# Patient Record
Sex: MALE | Race: BLACK OR AFRICAN AMERICAN | NOT HISPANIC OR LATINO | URBAN - METROPOLITAN AREA
[De-identification: names, ages, dates, MRNs, and addresses within clinical notes are randomized per-mention and may not be internally consistent; named-entity substitution may affect disease eponyms.]

---

## 2018-11-22 ENCOUNTER — INPATIENT (INPATIENT)
Facility: HOSPITAL | Age: 33
LOS: 6 days | Discharge: HOME | DRG: 885 | End: 2018-11-29
Attending: PSYCHIATRY & NEUROLOGY | Admitting: PSYCHIATRY & NEUROLOGY
Payer: MEDICARE

## 2018-11-22 VITALS
TEMPERATURE: 99 F | SYSTOLIC BLOOD PRESSURE: 186 MMHG | RESPIRATION RATE: 18 BRPM | DIASTOLIC BLOOD PRESSURE: 87 MMHG | OXYGEN SATURATION: 100 % | HEART RATE: 106 BPM

## 2018-11-22 DIAGNOSIS — F29 UNSPECIFIED PSYCHOSIS NOT DUE TO A SUBSTANCE OR KNOWN PHYSIOLOGICAL CONDITION: ICD-10-CM

## 2018-11-22 LAB
ALBUMIN SERPL ELPH-MCNC: 5 G/DL — SIGNIFICANT CHANGE UP (ref 3.3–5)
ALP SERPL-CCNC: 45 U/L — SIGNIFICANT CHANGE UP (ref 40–120)
ALT FLD-CCNC: 71 U/L — HIGH (ref 10–45)
AMPHET UR-MCNC: NEGATIVE — SIGNIFICANT CHANGE UP
ANION GAP SERPL CALC-SCNC: 14 MMOL/L — SIGNIFICANT CHANGE UP (ref 5–17)
APPEARANCE UR: CLEAR — SIGNIFICANT CHANGE UP
AST SERPL-CCNC: 197 U/L — HIGH (ref 10–40)
BACTERIA # UR AUTO: PRESENT /HPF
BARBITURATES UR SCN-MCNC: NEGATIVE — SIGNIFICANT CHANGE UP
BENZODIAZ UR-MCNC: NEGATIVE — SIGNIFICANT CHANGE UP
BILIRUB SERPL-MCNC: 1.2 MG/DL — SIGNIFICANT CHANGE UP (ref 0.2–1.2)
BILIRUB UR-MCNC: NEGATIVE — SIGNIFICANT CHANGE UP
BUN SERPL-MCNC: 12 MG/DL — SIGNIFICANT CHANGE UP (ref 7–23)
CALCIUM SERPL-MCNC: 9.1 MG/DL — SIGNIFICANT CHANGE UP (ref 8.4–10.5)
CHLORIDE SERPL-SCNC: 101 MMOL/L — SIGNIFICANT CHANGE UP (ref 96–108)
CO2 SERPL-SCNC: 23 MMOL/L — SIGNIFICANT CHANGE UP (ref 22–31)
COCAINE METAB.OTHER UR-MCNC: NEGATIVE — SIGNIFICANT CHANGE UP
COLOR SPEC: YELLOW — SIGNIFICANT CHANGE UP
CREAT SERPL-MCNC: 0.78 MG/DL — SIGNIFICANT CHANGE UP (ref 0.5–1.3)
DIFF PNL FLD: ABNORMAL
EPI CELLS # UR: SIGNIFICANT CHANGE UP /HPF (ref 0–5)
GLUCOSE SERPL-MCNC: 96 MG/DL — SIGNIFICANT CHANGE UP (ref 70–99)
GLUCOSE UR QL: NEGATIVE — SIGNIFICANT CHANGE UP
HCT VFR BLD CALC: 32.3 % — LOW (ref 39–50)
HGB BLD-MCNC: 10.1 G/DL — LOW (ref 13–17)
KETONES UR-MCNC: 40 MG/DL
LEUKOCYTE ESTERASE UR-ACNC: NEGATIVE — SIGNIFICANT CHANGE UP
MCHC RBC-ENTMCNC: 26.9 PG — LOW (ref 27–34)
MCHC RBC-ENTMCNC: 31.3 G/DL — LOW (ref 32–36)
MCV RBC AUTO: 85.9 FL — SIGNIFICANT CHANGE UP (ref 80–100)
METHADONE UR-MCNC: NEGATIVE — SIGNIFICANT CHANGE UP
NITRITE UR-MCNC: NEGATIVE — SIGNIFICANT CHANGE UP
OPIATES UR-MCNC: NEGATIVE — SIGNIFICANT CHANGE UP
PCP SPEC-MCNC: SIGNIFICANT CHANGE UP
PCP UR-MCNC: NEGATIVE — SIGNIFICANT CHANGE UP
PH UR: 6.5 — SIGNIFICANT CHANGE UP (ref 5–8)
PLATELET # BLD AUTO: 319 K/UL — SIGNIFICANT CHANGE UP (ref 150–400)
POTASSIUM SERPL-MCNC: 3.7 MMOL/L — SIGNIFICANT CHANGE UP (ref 3.5–5.3)
POTASSIUM SERPL-SCNC: 3.7 MMOL/L — SIGNIFICANT CHANGE UP (ref 3.5–5.3)
PROT SERPL-MCNC: 7.4 G/DL — SIGNIFICANT CHANGE UP (ref 6–8.3)
PROT UR-MCNC: ABNORMAL MG/DL
RBC # BLD: 3.76 M/UL — LOW (ref 4.2–5.8)
RBC # FLD: 18.1 % — HIGH (ref 10.3–16.9)
RBC CASTS # UR COMP ASSIST: ABNORMAL /HPF
SODIUM SERPL-SCNC: 138 MMOL/L — SIGNIFICANT CHANGE UP (ref 135–145)
SP GR SPEC: 1.02 — SIGNIFICANT CHANGE UP (ref 1–1.03)
THC UR QL: NEGATIVE — SIGNIFICANT CHANGE UP
UROBILINOGEN FLD QL: 0.2 E.U./DL — SIGNIFICANT CHANGE UP
WBC # BLD: 7.8 K/UL — SIGNIFICANT CHANGE UP (ref 3.8–10.5)
WBC # FLD AUTO: 7.8 K/UL — SIGNIFICANT CHANGE UP (ref 3.8–10.5)
WBC UR QL: < 5 /HPF — SIGNIFICANT CHANGE UP

## 2018-11-22 PROCEDURE — 99285 EMERGENCY DEPT VISIT HI MDM: CPT

## 2018-11-22 PROCEDURE — 90792 PSYCH DIAG EVAL W/MED SRVCS: CPT

## 2018-11-22 PROCEDURE — 70450 CT HEAD/BRAIN W/O DYE: CPT | Mod: 26

## 2018-11-22 RX ORDER — IBUPROFEN 200 MG
600 TABLET ORAL EVERY 6 HOURS
Qty: 0 | Refills: 0 | Status: DISCONTINUED | OUTPATIENT
Start: 2018-11-22 | End: 2018-11-29

## 2018-11-22 RX ORDER — RISPERIDONE 4 MG/1
1 TABLET ORAL ONCE
Qty: 0 | Refills: 0 | Status: COMPLETED | OUTPATIENT
Start: 2018-11-22 | End: 2018-11-22

## 2018-11-22 RX ORDER — DIPHENHYDRAMINE HCL 50 MG
50 CAPSULE ORAL EVERY 6 HOURS
Qty: 0 | Refills: 0 | Status: DISCONTINUED | OUTPATIENT
Start: 2018-11-22 | End: 2018-11-29

## 2018-11-22 RX ORDER — HALOPERIDOL DECANOATE 100 MG/ML
5 INJECTION INTRAMUSCULAR EVERY 6 HOURS
Qty: 0 | Refills: 0 | Status: DISCONTINUED | OUTPATIENT
Start: 2018-11-22 | End: 2018-11-29

## 2018-11-22 RX ORDER — SODIUM CHLORIDE 9 MG/ML
1000 INJECTION INTRAMUSCULAR; INTRAVENOUS; SUBCUTANEOUS ONCE
Qty: 0 | Refills: 0 | Status: COMPLETED | OUTPATIENT
Start: 2018-11-22 | End: 2018-11-22

## 2018-11-22 RX ORDER — RISPERIDONE 4 MG/1
1 TABLET ORAL AT BEDTIME
Qty: 0 | Refills: 0 | Status: DISCONTINUED | OUTPATIENT
Start: 2018-11-22 | End: 2018-11-22

## 2018-11-22 RX ORDER — ALBUTEROL 90 UG/1
2 AEROSOL, METERED ORAL EVERY 4 HOURS
Qty: 0 | Refills: 0 | Status: DISCONTINUED | OUTPATIENT
Start: 2018-11-22 | End: 2018-11-29

## 2018-11-22 RX ADMIN — Medication 50 MILLIGRAM(S): at 15:07

## 2018-11-22 RX ADMIN — Medication 2 MILLIGRAM(S): at 15:08

## 2018-11-22 RX ADMIN — HALOPERIDOL DECANOATE 5 MILLIGRAM(S): 100 INJECTION INTRAMUSCULAR at 15:07

## 2018-11-22 RX ADMIN — SODIUM CHLORIDE 1000 MILLILITER(S): 9 INJECTION INTRAMUSCULAR; INTRAVENOUS; SUBCUTANEOUS at 14:17

## 2018-11-22 RX ADMIN — SODIUM CHLORIDE 1000 MILLILITER(S): 9 INJECTION INTRAMUSCULAR; INTRAVENOUS; SUBCUTANEOUS at 10:59

## 2018-11-22 NOTE — ED ADULT NURSE NOTE - OTHER COMPLAINTS
[place]. Patient reports chest pain s/p fall.

## 2018-11-22 NOTE — ED ADULT TRIAGE NOTE - OTHER COMPLAINTS
[place] per EMS[place] patient [place] on scene. Patient reports chest pain s/p fall. As per EMS, [place] and patient was shivering on scene. Patient reports chest pain s/p fall.

## 2018-11-22 NOTE — ED BEHAVIORAL HEALTH ASSESSMENT NOTE - MEDICAL ISSUES AND PLAN (INCLUDE STANDING AND PRN MEDICATION)
Asthma:  start albuterol prn for SOB Asthma:  start albuterol prn for SOB, Repeat LFTs tomorrow, elevated today, other admission labs.

## 2018-11-22 NOTE — ED BEHAVIORAL HEALTH ASSESSMENT NOTE - HPI (INCLUDE ILLNESS QUALITY, SEVERITY, DURATION, TIMING, CONTEXT, MODIFYING FACTORS, ASSOCIATED SIGNS AND SYMPTOMS)
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  Patient initially presented with vague complains such as having pain in chest after a fall, then started voicing delusions and psychosis to ED provider, leading to psych consult.  Patient is seen and evaluated by bedside at ED, he is cooperative but oddly related, aaox 3, initially perseverates on how [place] was [place] the subway, due to he went to a "party" and he and his friends left, and went on the train.  "A man, made me smell", "like a ferry", "A man is coming out of my chest" "there are babies in my body."  Then when asked to explained, he said, " I am a man, I cannot have babies."  He reports having AH sometimes, denied it to be commanding in nature, but is not able to explain further regarding to psychotic symptoms, denied active SI plan/intent, but said he does not feel safe outside the hospital.  He denied VH.  He declined to consent for collateral at this time as he does not have anyone or his family's phone number.  He reports his sleep, appetite, energy to be "not too bad".  Patient agrees with plan for inpatient psychiatric voluntary admission once medically cleared. 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  Patient initially presented with vague complains such as having pain in chest after a fall, then started voicing delusions and psychosis to ED provider, leading to psych consult.  Patient is seen and evaluated by bedside at ED, he is cooperative but oddly related, aaox 3, initially perseverates on how he was [place] the subway, due to he went to a "party" and he and his friends left, and went on the train.  "A man, made me smell", "like a ferry", "A man is coming out of my chest" "there are babies in my body."  Then when asked to explained, he said, " I am a man, I cannot have babies."  He reports having AH sometimes, denied it to be commanding in nature, but is not able to explain further regarding to psychotic symptoms, denied active SI plan/intent, but said he does not feel safe outside the hospital.  He denied VH.  He declined to consent for collateral at this time as he does not have anyone or his family's phone number.  He reports his sleep, appetite, energy to be "not too bad".  Patient agrees with plan for inpatient psychiatric voluntary admission once medically cleared.  CTH unrevealing, no acute intracranial pathology was found warranting acute medical attention, LFTs is noted to be elevated.

## 2018-11-22 NOTE — ED BEHAVIORAL HEALTH ASSESSMENT NOTE - OTHER
left sided myoneurofibroma noted on face not tested impaired insight and judgment, loss of function due to psychosis delusions and halluciations, untreated psychosis

## 2018-11-22 NOTE — ED BEHAVIORAL HEALTH ASSESSMENT NOTE - AXIS IV
Problems with primary support/Occupational problems/Problem related to social environment/Housing problems/Problems with access to healthcare services/Economic problems

## 2018-11-22 NOTE — ED PROVIDER NOTE - OBJECTIVE STATEMENT
33 m w bodily delusions- claims that a 'man came out of him', can't specify further denies hallucinations/si/hi  denies drugs alcohol  states he is homeless, spent last night riding on the train  no psych hx  moderate severity  no exac/allev factors

## 2018-11-22 NOTE — ED PROVIDER NOTE - PSYCHIATRIC, MLM
Alert and oriented to person, place, time not to situation. normal mood and affect. no apparent risk to self or others + bodily delusions

## 2018-11-22 NOTE — ED ADULT NURSE NOTE - OBJECTIVE STATEMENT
Pt [place].  Pt is poor medical historian.  Pt is A&Ox3, but elicits flights of fancy on exam.  Pt states "I had a baby on my chest."  Pt elicits 4/10 CP.  ECG shows sinus tach.  Pt denies cough, fever/chills, N/V/D or head injury.  Pt is pending eval by [facility].

## 2018-11-22 NOTE — ED BEHAVIORAL HEALTH ASSESSMENT NOTE - AXIS III
asthma, mild intermittent, myoneurofibroma on left sided face asthma, mild intermittent, myoneurofibroma on left sided face, elevated LFTs

## 2018-11-22 NOTE — ED BEHAVIORAL HEALTH ASSESSMENT NOTE - DESCRIPTION
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

## 2018-11-22 NOTE — ED BEHAVIORAL HEALTH ASSESSMENT NOTE - RISK ASSESSMENT
Patient has no prior SA, no access to firearm, now future oriented, but has mental illness not in treatment, currently homeless, has limited resources, male gender, no h/o violence, poor insight and in need of treatment due to delusions and psychosis will place him on higher risk at this time.

## 2018-11-22 NOTE — ED ADULT NURSE NOTE - CHPI ED NUR SYMPTOMS NEG
no nausea/no fever/no decreased eating/drinking/no dizziness/no tingling/no chills/no vomiting/no weakness

## 2018-11-22 NOTE — ED BEHAVIORAL HEALTH ASSESSMENT NOTE - SUMMARY
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  Patient initially presented with vague complains such as having pain in chest after a fall, then started voicing delusions and psychosis to ED provider, leading to psych consult.  Patient is seen and evaluated by bedside at ED, he is cooperative but oddly related, aaox 3, voiced AH and delusions of having man and/or babies in the chest, stating reason for ED visit was due to "smelling like ferry", oddly related, poor insight on exam, will benefit from psychiatric admission once medically cleared.

## 2018-11-22 NOTE — ED BEHAVIORAL HEALTH ASSESSMENT NOTE - DETAILS
handoff provided Dr. Kent notified of assessment and plan to admit to [facility] once medically cleared for admission

## 2018-11-22 NOTE — ED BEHAVIORAL HEALTH ASSESSMENT NOTE - OTHER PAST PSYCHIATRIC HISTORY (INCLUDE DETAILS REGARDING ONSET, COURSE OF ILLNESS, INPATIENT/OUTPATIENT TREATMENT)
patient reports 1 prior episodes of confusion, having psychotic symptoms such as AH, is not able to identify timeline, denied SA in history

## 2018-11-22 NOTE — ED BEHAVIORAL HEALTH ASSESSMENT NOTE - PSYCHIATRIC ISSUES AND PLAN (INCLUDE STANDING AND PRN MEDICATION)
start risperdal 1mg po qhs, and haldol 5mg/ativan 2mg/benadryl 50mg q6H prn for agitation/psychosis/eps prophylaxis

## 2018-11-23 LAB
ALBUMIN SERPL ELPH-MCNC: 4.9 G/DL — SIGNIFICANT CHANGE UP (ref 3.3–5)
ALP SERPL-CCNC: 47 U/L — SIGNIFICANT CHANGE UP (ref 40–120)
ALT FLD-CCNC: 109 U/L — HIGH (ref 10–45)
AST SERPL-CCNC: 430 U/L — HIGH (ref 10–40)
BILIRUB DIRECT SERPL-MCNC: 0.2 MG/DL — SIGNIFICANT CHANGE UP (ref 0–0.2)
BILIRUB INDIRECT FLD-MCNC: 1.7 MG/DL — HIGH (ref 0.2–1)
BILIRUB SERPL-MCNC: 1.9 MG/DL — HIGH (ref 0.2–1.2)
CHOLEST SERPL-MCNC: 247 MG/DL — HIGH (ref 10–199)
HBA1C BLD-MCNC: 4.5 % — SIGNIFICANT CHANGE UP (ref 4–5.6)
HDLC SERPL-MCNC: 114 MG/DL — SIGNIFICANT CHANGE UP
LIPID PNL WITH DIRECT LDL SERPL: 120 MG/DL — SIGNIFICANT CHANGE UP
PROT SERPL-MCNC: 7.3 G/DL — SIGNIFICANT CHANGE UP (ref 6–8.3)
T4 AB SER-ACNC: 7.12 UG/DL — SIGNIFICANT CHANGE UP (ref 3.17–11.72)
TOTAL CHOLESTEROL/HDL RATIO MEASUREMENT: 2.2 RATIO — LOW (ref 3.4–9.6)
TRIGL SERPL-MCNC: 66 MG/DL — SIGNIFICANT CHANGE UP (ref 10–149)
TSH SERPL-MCNC: 1.29 UIU/ML — SIGNIFICANT CHANGE UP (ref 0.35–4.94)
VIT B12 SERPL-MCNC: 482 PG/ML — SIGNIFICANT CHANGE UP (ref 232–1245)

## 2018-11-23 PROCEDURE — 99233 SBSQ HOSP IP/OBS HIGH 50: CPT

## 2018-11-23 RX ADMIN — ALBUTEROL 2 PUFF(S): 90 AEROSOL, METERED ORAL at 21:39

## 2018-11-23 RX ADMIN — Medication 50 MILLIGRAM(S): at 21:09

## 2018-11-23 RX ADMIN — HALOPERIDOL DECANOATE 5 MILLIGRAM(S): 100 INJECTION INTRAMUSCULAR at 21:09

## 2018-11-24 LAB
ALBUMIN SERPL ELPH-MCNC: 4.6 G/DL — SIGNIFICANT CHANGE UP (ref 3.3–5)
ALP SERPL-CCNC: 47 U/L — SIGNIFICANT CHANGE UP (ref 40–120)
ALT FLD-CCNC: 112 U/L — HIGH (ref 10–45)
ANION GAP SERPL CALC-SCNC: 14 MMOL/L — SIGNIFICANT CHANGE UP (ref 5–17)
APTT BLD: 30.9 SEC — SIGNIFICANT CHANGE UP (ref 27.5–36.3)
AST SERPL-CCNC: 379 U/L — HIGH (ref 10–40)
BILIRUB SERPL-MCNC: 0.6 MG/DL — SIGNIFICANT CHANGE UP (ref 0.2–1.2)
BUN SERPL-MCNC: 12 MG/DL — SIGNIFICANT CHANGE UP (ref 7–23)
CALCIUM SERPL-MCNC: 9.1 MG/DL — SIGNIFICANT CHANGE UP (ref 8.4–10.5)
CHLORIDE SERPL-SCNC: 100 MMOL/L — SIGNIFICANT CHANGE UP (ref 96–108)
CO2 SERPL-SCNC: 26 MMOL/L — SIGNIFICANT CHANGE UP (ref 22–31)
CREAT SERPL-MCNC: 0.85 MG/DL — SIGNIFICANT CHANGE UP (ref 0.5–1.3)
FERRITIN SERPL-MCNC: 19 NG/ML — LOW (ref 30–400)
GLUCOSE SERPL-MCNC: 115 MG/DL — HIGH (ref 70–99)
HAPTOGLOB SERPL-MCNC: 83 MG/DL — SIGNIFICANT CHANGE UP (ref 34–200)
HBV SURFACE AG SER-ACNC: SIGNIFICANT CHANGE UP
HCT VFR BLD CALC: 32.3 % — LOW (ref 39–50)
HCV AB S/CO SERPL IA: 0.07 S/CO — SIGNIFICANT CHANGE UP
HCV AB SERPL-IMP: SIGNIFICANT CHANGE UP
HGB BLD-MCNC: 10.1 G/DL — LOW (ref 13–17)
INR BLD: 1.03 — SIGNIFICANT CHANGE UP (ref 0.88–1.16)
IRON SATN MFR SERPL: 24 UG/DL — LOW (ref 45–165)
IRON SATN MFR SERPL: 8 % — LOW (ref 16–55)
LDH SERPL L TO P-CCNC: 599 U/L — HIGH (ref 50–242)
MCHC RBC-ENTMCNC: 27.6 PG — SIGNIFICANT CHANGE UP (ref 27–34)
MCHC RBC-ENTMCNC: 31.3 G/DL — LOW (ref 32–36)
MCV RBC AUTO: 88.3 FL — SIGNIFICANT CHANGE UP (ref 80–100)
PLATELET # BLD AUTO: 386 K/UL — SIGNIFICANT CHANGE UP (ref 150–400)
POTASSIUM SERPL-MCNC: 4.4 MMOL/L — SIGNIFICANT CHANGE UP (ref 3.5–5.3)
POTASSIUM SERPL-SCNC: 4.4 MMOL/L — SIGNIFICANT CHANGE UP (ref 3.5–5.3)
PROT SERPL-MCNC: 7.4 G/DL — SIGNIFICANT CHANGE UP (ref 6–8.3)
PROTHROM AB SERPL-ACNC: 11.6 SEC — SIGNIFICANT CHANGE UP (ref 10–12.9)
RBC # BLD: 3.66 M/UL — LOW (ref 4.2–5.8)
RBC # FLD: 17 % — HIGH (ref 10.3–16.9)
SODIUM SERPL-SCNC: 140 MMOL/L — SIGNIFICANT CHANGE UP (ref 135–145)
TIBC SERPL-MCNC: 305 UG/DL — SIGNIFICANT CHANGE UP (ref 220–430)
TRANSFERRIN SERPL-MCNC: 254 MG/DL — SIGNIFICANT CHANGE UP (ref 200–360)
UIBC SERPL-MCNC: 281 UG/DL — SIGNIFICANT CHANGE UP (ref 110–370)
WBC # BLD: 4.4 K/UL — SIGNIFICANT CHANGE UP (ref 3.8–10.5)
WBC # FLD AUTO: 4.4 K/UL — SIGNIFICANT CHANGE UP (ref 3.8–10.5)

## 2018-11-24 RX ORDER — HYDROXYZINE HCL 10 MG
50 TABLET ORAL THREE TIMES A DAY
Qty: 0 | Refills: 0 | Status: DISCONTINUED | OUTPATIENT
Start: 2018-11-24 | End: 2018-11-29

## 2018-11-24 RX ADMIN — Medication 50 MILLIGRAM(S): at 21:01

## 2018-11-24 RX ADMIN — ALBUTEROL 2 PUFF(S): 90 AEROSOL, METERED ORAL at 12:35

## 2018-11-24 RX ADMIN — ALBUTEROL 2 PUFF(S): 90 AEROSOL, METERED ORAL at 18:59

## 2018-11-24 RX ADMIN — Medication 600 MILLIGRAM(S): at 13:16

## 2018-11-24 RX ADMIN — ALBUTEROL 2 PUFF(S): 90 AEROSOL, METERED ORAL at 08:54

## 2018-11-24 RX ADMIN — Medication 50 MILLIGRAM(S): at 19:27

## 2018-11-24 RX ADMIN — Medication 600 MILLIGRAM(S): at 12:35

## 2018-11-24 NOTE — PROGRESS NOTE BEHAVIORAL HEALTH - SUMMARY
This is a 33 years old M, no past psychiatric history, with medical history of myoneurofibroma, B[place] EMS [place]    On interview, patient is disorganized, anxious, can't elaborate the situation leading to the admission.  He denies hallucination or delusions.  He denies substance use.  Based on collateral, he has one week duration of possible paranoia and reference delusions.  Given the acute onset and labs abnormality, psychosis secondary to another medical condition can't be ruled out.   Although patient denies substance use and denies positive symptoms, he may be minimizing his symptoms.      Plan:  1.  Continue monitoring symptoms   2. Safety:  Patient is in good behavioral control,  continue PRN medications for agitation   3.  Substance use:  Denies and negative Utox  4: Psychosis:   Patient denies, unclear whether is secondary to medical condition, will consider standing antipsychotic if LFT trend down and patient continue to be psychotic.    5. Medical: Low H/H, asymptomatic, elevated LFT, continue trending,  medicine consulted.  Hep A, hep B Hep C negative   6. Continue with groups, milieus and social  7. disposition: pending This is a 33 years old M, no past psychiatric history, with medical history of myoneurofibroma, BIB EMS for [place].    On interview, patient is disorganized, anxious, can't elaborate the situation leading to the admission.  He denies hallucination or delusions.  He denies substance use.  Based on collateral, he has one week duration of possible paranoia and reference delusions.  Given the acute onset and labs abnormality, psychosis secondary to another medical condition can't be ruled out.   Although patient denies substance use and denies positive symptoms, he may be minimizing his symptoms.      Plan:  1.  Continue monitoring symptoms   2. Safety:  Patient is in good behavioral control,  continue PRN medications for agitation   3.  Substance use:  Denies and negative Utox  4: Psychosis:   Patient denies, unclear whether is secondary to medical condition, will consider standing antipsychotic if LFT trend down and patient continue to be psychotic.       Add hydroxyzine 50mg Q6 hours PRN for anxiety and insomnia  5. Medical: Low H/H, asymptomatic, elevated LFT, continue trending,  medicine consulted.  Hep A, hep B Hep C negative   6. Continue with groups, milieus and social  7. disposition: pending

## 2018-11-24 NOTE — PROGRESS NOTE BEHAVIORAL HEALTH - NSBHFUPINTERVALCCFT_PSY_A_CORE
"I saw some chemical on the ferry" "I smelled some chemical on the [facility]"

## 2018-11-24 NOTE — PROGRESS NOTE BEHAVIORAL HEALTH - NSBHADMITCOUNSEL_PSY_A_CORE
instructions for management, treatment and follow up/diagnostic results/impressions and/or recommended studies/risks and benefits of treatment options

## 2018-11-25 LAB
ALBUMIN SERPL ELPH-MCNC: 4.7 G/DL — SIGNIFICANT CHANGE UP (ref 3.3–5)
ALP SERPL-CCNC: 41 U/L — SIGNIFICANT CHANGE UP (ref 40–120)
ALT FLD-CCNC: 105 U/L — HIGH (ref 10–45)
ANION GAP SERPL CALC-SCNC: 8 MMOL/L — SIGNIFICANT CHANGE UP (ref 5–17)
AST SERPL-CCNC: 278 U/L — HIGH (ref 10–40)
BILIRUB SERPL-MCNC: 0.7 MG/DL — SIGNIFICANT CHANGE UP (ref 0.2–1.2)
BUN SERPL-MCNC: 12 MG/DL — SIGNIFICANT CHANGE UP (ref 7–23)
CALCIUM SERPL-MCNC: 9.1 MG/DL — SIGNIFICANT CHANGE UP (ref 8.4–10.5)
CHLORIDE SERPL-SCNC: 99 MMOL/L — SIGNIFICANT CHANGE UP (ref 96–108)
CO2 SERPL-SCNC: 27 MMOL/L — SIGNIFICANT CHANGE UP (ref 22–31)
CREAT SERPL-MCNC: 0.8 MG/DL — SIGNIFICANT CHANGE UP (ref 0.5–1.3)
GLUCOSE SERPL-MCNC: 78 MG/DL — SIGNIFICANT CHANGE UP (ref 70–99)
HAV IGG SER QL IA: SIGNIFICANT CHANGE UP
HAV IGM SER-ACNC: SIGNIFICANT CHANGE UP
HBV CORE AB SER-ACNC: SIGNIFICANT CHANGE UP
HBV CORE IGM SER-ACNC: SIGNIFICANT CHANGE UP
HBV SURFACE AB SER-ACNC: SIGNIFICANT CHANGE UP
POTASSIUM SERPL-MCNC: 4.1 MMOL/L — SIGNIFICANT CHANGE UP (ref 3.5–5.3)
POTASSIUM SERPL-SCNC: 4.1 MMOL/L — SIGNIFICANT CHANGE UP (ref 3.5–5.3)
PROT SERPL-MCNC: 7.2 G/DL — SIGNIFICANT CHANGE UP (ref 6–8.3)
SODIUM SERPL-SCNC: 134 MMOL/L — LOW (ref 135–145)

## 2018-11-25 RX ADMIN — Medication 50 MILLIGRAM(S): at 15:58

## 2018-11-25 RX ADMIN — Medication 50 MILLIGRAM(S): at 21:03

## 2018-11-25 RX ADMIN — ALBUTEROL 2 PUFF(S): 90 AEROSOL, METERED ORAL at 09:48

## 2018-11-25 RX ADMIN — ALBUTEROL 2 PUFF(S): 90 AEROSOL, METERED ORAL at 21:04

## 2018-11-25 NOTE — PROGRESS NOTE BEHAVIORAL HEALTH - NSBHFUPINTERVALHXFT_PSY_A_CORE
The patient is a 33-year-old  man, single, domiciled with family in [place], on SSD for neurofibroma, medical history of asthma, no prior psychiatric history, no prior SA/SIB, presented after [place] by [place], denied h/o substance use, no known seizure d/o.      Patient seen and assessed at bedside. No acute events overnight. Patient says he slept well, and had good appetite this morning. Denies any complaints currently. Had visits from family members yesterday, which he said went well. Does not understand why he is in the hospital. Denies any paranoia, anxiety, or ''strange thoughts." Wonders about elevated liver enzymes. Scheduled for ultrasound today.

## 2018-11-25 NOTE — PROGRESS NOTE BEHAVIORAL HEALTH - SUMMARY
This is a 33 years old M, no past psychiatric history, with medical history of myoneurofibroma, [place] EMS [place].    On interview, patient is more organized today, with no overt signs of psychosis. Based on collateral, however, he has one week duration of possible paranoia and reference delusions.  Given the acute onset and labs abnormality, psychosis secondary to another medical condition can't be ruled out.  Although patient denies substance use and denies positive symptoms, he may be minimizing his symptoms.      Plan:  1.  Continue monitoring symptoms   2. Safety:  Patient is in good behavioral control,  continue PRN medications for agitation   3.  Substance use:  Denies and negative Utox  4:  Psychosis:   Patient denies, unclear whether is secondary to medical condition, will consider standing antipsychotic if LFT trend down and patient continue to be psychotic.       Add hydroxyzine 50mg Q6 hours PRN for anxiety and insomnia  5. Medical: Low H/H, asymptomatic, elevated LFT, continue trending,  medicine consulted.  Hep A, hep B Hep C negative   6. Continue with groups, milieus and social  7. disposition: pending

## 2018-11-26 LAB
ALBUMIN SERPL ELPH-MCNC: 4.1 G/DL — SIGNIFICANT CHANGE UP (ref 3.3–5)
ALP SERPL-CCNC: 38 U/L — LOW (ref 40–120)
ALT FLD-CCNC: 79 U/L — HIGH (ref 10–45)
ANION GAP SERPL CALC-SCNC: 8 MMOL/L — SIGNIFICANT CHANGE UP (ref 5–17)
AST SERPL-CCNC: 143 U/L — HIGH (ref 10–40)
BILIRUB SERPL-MCNC: 0.4 MG/DL — SIGNIFICANT CHANGE UP (ref 0.2–1.2)
BUN SERPL-MCNC: 15 MG/DL — SIGNIFICANT CHANGE UP (ref 7–23)
CALCIUM SERPL-MCNC: 8.9 MG/DL — SIGNIFICANT CHANGE UP (ref 8.4–10.5)
CHLORIDE SERPL-SCNC: 106 MMOL/L — SIGNIFICANT CHANGE UP (ref 96–108)
CO2 SERPL-SCNC: 25 MMOL/L — SIGNIFICANT CHANGE UP (ref 22–31)
CREAT SERPL-MCNC: 0.84 MG/DL — SIGNIFICANT CHANGE UP (ref 0.5–1.3)
GLUCOSE SERPL-MCNC: 94 MG/DL — SIGNIFICANT CHANGE UP (ref 70–99)
HCT VFR BLD CALC: 31.9 % — LOW (ref 39–50)
HGB BLD-MCNC: 9.9 G/DL — LOW (ref 13–17)
MCHC RBC-ENTMCNC: 26.8 PG — LOW (ref 27–34)
MCHC RBC-ENTMCNC: 31 G/DL — LOW (ref 32–36)
MCV RBC AUTO: 86.2 FL — SIGNIFICANT CHANGE UP (ref 80–100)
PLATELET # BLD AUTO: 394 K/UL — SIGNIFICANT CHANGE UP (ref 150–400)
POTASSIUM SERPL-MCNC: 4.9 MMOL/L — SIGNIFICANT CHANGE UP (ref 3.5–5.3)
POTASSIUM SERPL-SCNC: 4.9 MMOL/L — SIGNIFICANT CHANGE UP (ref 3.5–5.3)
PROT SERPL-MCNC: 6.3 G/DL — SIGNIFICANT CHANGE UP (ref 6–8.3)
RBC # BLD: 3.7 M/UL — LOW (ref 4.2–5.8)
RBC # FLD: 16.9 % — SIGNIFICANT CHANGE UP (ref 10.3–16.9)
SODIUM SERPL-SCNC: 139 MMOL/L — SIGNIFICANT CHANGE UP (ref 135–145)
WBC # BLD: 3.8 K/UL — SIGNIFICANT CHANGE UP (ref 3.8–10.5)
WBC # FLD AUTO: 3.8 K/UL — SIGNIFICANT CHANGE UP (ref 3.8–10.5)

## 2018-11-26 PROCEDURE — 99232 SBSQ HOSP IP/OBS MODERATE 35: CPT

## 2018-11-26 PROCEDURE — 99233 SBSQ HOSP IP/OBS HIGH 50: CPT

## 2018-11-26 PROCEDURE — 76705 ECHO EXAM OF ABDOMEN: CPT | Mod: 26

## 2018-11-26 RX ORDER — FERROUS SULFATE 325(65) MG
325 TABLET ORAL EVERY 8 HOURS
Qty: 0 | Refills: 0 | Status: DISCONTINUED | OUTPATIENT
Start: 2018-11-26 | End: 2018-11-29

## 2018-11-26 RX ADMIN — Medication 50 MILLIGRAM(S): at 16:25

## 2018-11-26 RX ADMIN — Medication 50 MILLIGRAM(S): at 21:09

## 2018-11-26 RX ADMIN — ALBUTEROL 2 PUFF(S): 90 AEROSOL, METERED ORAL at 16:23

## 2018-11-26 RX ADMIN — HALOPERIDOL DECANOATE 5 MILLIGRAM(S): 100 INJECTION INTRAMUSCULAR at 21:09

## 2018-11-26 RX ADMIN — Medication 325 MILLIGRAM(S): at 21:09

## 2018-11-27 PROCEDURE — 99232 SBSQ HOSP IP/OBS MODERATE 35: CPT

## 2018-11-27 PROCEDURE — 99233 SBSQ HOSP IP/OBS HIGH 50: CPT | Mod: GC

## 2018-11-27 RX ORDER — POLYETHYLENE GLYCOL 3350 17 G/17G
17 POWDER, FOR SOLUTION ORAL DAILY
Qty: 0 | Refills: 0 | Status: DISCONTINUED | OUTPATIENT
Start: 2018-11-27 | End: 2018-11-29

## 2018-11-27 RX ORDER — QUETIAPINE FUMARATE 200 MG/1
50 TABLET, FILM COATED ORAL AT BEDTIME
Qty: 0 | Refills: 0 | Status: DISCONTINUED | OUTPATIENT
Start: 2018-11-27 | End: 2018-11-29

## 2018-11-27 RX ADMIN — Medication 50 MILLIGRAM(S): at 13:24

## 2018-11-27 RX ADMIN — Medication 325 MILLIGRAM(S): at 14:12

## 2018-11-27 RX ADMIN — ALBUTEROL 2 PUFF(S): 90 AEROSOL, METERED ORAL at 21:19

## 2018-11-27 RX ADMIN — QUETIAPINE FUMARATE 50 MILLIGRAM(S): 200 TABLET, FILM COATED ORAL at 21:18

## 2018-11-27 RX ADMIN — Medication 325 MILLIGRAM(S): at 21:17

## 2018-11-27 RX ADMIN — Medication 325 MILLIGRAM(S): at 07:06

## 2018-11-27 NOTE — PROGRESS NOTE BEHAVIORAL HEALTH - SUMMARY
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  Patient initially presented with vague complains such as having pain in chest after a fall, then started voicing delusions and psychosis to ED provider, leading to psych consult.  Patient is seen and evaluated by bedside at ED, he is cooperative but oddly related, aaox 3, voiced AH and delusions of having man and/or babies in the chest, stating reason for ED visit was due to "smelling like ferry", oddly related, poor insight on exam, will benefit from psychiatric admission once medically cleared.    ;;11/23: prominent pressured speech and in ED demonstrated paranoid ideas; urine tox negative ; Risperdal titration for mood/pschosis.    ;;11/26: US of Abdomen pending because of abnormal LFTs with ETOH abuse  suspected as an issue.  Irritable and food focused today; fair ADLs.    ;;11/27: US Abdomen wnl; patient accepting Seroquel 50mg at night for mood/anxiety.  Recieved prn Haldol last night when agitated.  No SI or HI or AVH.  Very discharged focused.  Good ADLs.

## 2018-11-27 NOTE — PROGRESS NOTE BEHAVIORAL HEALTH - NSBHFUPINTERVALCCFT_PSY_A_CORE
wants his mother contacted; focused on when he is leaving.  Sleep appetite ok; no pain issues;  not conversational ; very discharged focused.   Discussed use of  Seroquel at night for sleep/anxiety/mood.   Later in the day spoke with the patient's mother who plans to visit tomorrow.

## 2018-11-27 NOTE — PROGRESS NOTE BEHAVIORAL HEALTH - NSBHFUPINTERVALHXFT_PSY_A_CORE
Abdomenal US WNL;  Issue of substance use still unclear.    .  MSE: ; very discharged focused; rather anxious;  fair eye contact; clean.  oriented ;.  denies AVH or s/h i/i/p; speech normal volume, spontaneous, clear; No AVH not preoccupied or blocked.  .  .;  no tremor or rigidity.   ;anxious; tc/tp no peculiarities of language use; i&j: fair to poor.   not   reflective.  somewhat irritable.

## 2018-11-28 LAB
ALBUMIN SERPL ELPH-MCNC: 4.4 G/DL — SIGNIFICANT CHANGE UP (ref 3.3–5)
ALP SERPL-CCNC: 38 U/L — LOW (ref 40–120)
ALT FLD-CCNC: 56 U/L — HIGH (ref 10–45)
ANION GAP SERPL CALC-SCNC: 8 MMOL/L — SIGNIFICANT CHANGE UP (ref 5–17)
AST SERPL-CCNC: 52 U/L — HIGH (ref 10–40)
BILIRUB SERPL-MCNC: 0.3 MG/DL — SIGNIFICANT CHANGE UP (ref 0.2–1.2)
BUN SERPL-MCNC: 20 MG/DL — SIGNIFICANT CHANGE UP (ref 7–23)
CALCIUM SERPL-MCNC: 9.1 MG/DL — SIGNIFICANT CHANGE UP (ref 8.4–10.5)
CHLORIDE SERPL-SCNC: 103 MMOL/L — SIGNIFICANT CHANGE UP (ref 96–108)
CHOLEST SERPL-MCNC: 194 MG/DL — SIGNIFICANT CHANGE UP (ref 10–199)
CO2 SERPL-SCNC: 26 MMOL/L — SIGNIFICANT CHANGE UP (ref 22–31)
CREAT SERPL-MCNC: 0.96 MG/DL — SIGNIFICANT CHANGE UP (ref 0.5–1.3)
GLUCOSE SERPL-MCNC: 91 MG/DL — SIGNIFICANT CHANGE UP (ref 70–99)
HBA1C BLD-MCNC: <4.2 % — SIGNIFICANT CHANGE UP (ref 4–5.6)
HDLC SERPL-MCNC: 70 MG/DL — SIGNIFICANT CHANGE UP
LIPID PNL WITH DIRECT LDL SERPL: 106 MG/DL — SIGNIFICANT CHANGE UP
POTASSIUM SERPL-MCNC: 4.9 MMOL/L — SIGNIFICANT CHANGE UP (ref 3.5–5.3)
POTASSIUM SERPL-SCNC: 4.9 MMOL/L — SIGNIFICANT CHANGE UP (ref 3.5–5.3)
PROT SERPL-MCNC: 6.6 G/DL — SIGNIFICANT CHANGE UP (ref 6–8.3)
SODIUM SERPL-SCNC: 137 MMOL/L — SIGNIFICANT CHANGE UP (ref 135–145)
TOTAL CHOLESTEROL/HDL RATIO MEASUREMENT: 2.8 RATIO — LOW (ref 3.4–9.6)
TRIGL SERPL-MCNC: 89 MG/DL — SIGNIFICANT CHANGE UP (ref 10–149)

## 2018-11-28 PROCEDURE — 99233 SBSQ HOSP IP/OBS HIGH 50: CPT | Mod: GC

## 2018-11-28 PROCEDURE — 99232 SBSQ HOSP IP/OBS MODERATE 35: CPT

## 2018-11-28 RX ADMIN — Medication 325 MILLIGRAM(S): at 07:15

## 2018-11-28 RX ADMIN — QUETIAPINE FUMARATE 50 MILLIGRAM(S): 200 TABLET, FILM COATED ORAL at 21:23

## 2018-11-28 RX ADMIN — Medication 50 MILLIGRAM(S): at 21:58

## 2018-11-28 RX ADMIN — ALBUTEROL 2 PUFF(S): 90 AEROSOL, METERED ORAL at 21:59

## 2018-11-28 RX ADMIN — POLYETHYLENE GLYCOL 3350 17 GRAM(S): 17 POWDER, FOR SOLUTION ORAL at 09:37

## 2018-11-28 RX ADMIN — Medication 325 MILLIGRAM(S): at 21:23

## 2018-11-28 RX ADMIN — Medication 325 MILLIGRAM(S): at 13:54

## 2018-11-28 RX ADMIN — ALBUTEROL 2 PUFF(S): 90 AEROSOL, METERED ORAL at 09:37

## 2018-11-28 RX ADMIN — Medication 50 MILLIGRAM(S): at 09:36

## 2018-11-28 NOTE — PROGRESS NOTE BEHAVIORAL HEALTH - SUMMARY
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  Patient initially presented with vague complains such as having pain in chest after a fall, then started voicing delusions and psychosis to ED provider, leading to psych consult.  Patient is seen and evaluated by bedside at ED, he is cooperative but oddly related, aaox 3, voiced AH and delusions of having man and/or babies in the chest, stating reason for ED visit was due to "smelling like ferry", oddly related, poor insight on exam, will benefit from psychiatric admission once medically cleared.    ;;11/23: prominent pressured speech and in ED demonstrated paranoid ideas; urine tox negative ; Risperdal titration for mood/pschosis.    ;;11/26: US of Abdomen pending because of abnormal LFTs with ETOH abuse  suspected as an issue.  Irritable and food focused today; fair ADLs.    ;;11/27: US Abdomen wnl; patient accepting Seroquel 50mg at night for mood/anxiety.  Recieved prn Haldol last night when agitated.  No SI or HI or AVH.  Very discharged focused.  Good ADLs.

## 2018-11-28 NOTE — PROGRESS NOTE BEHAVIORAL HEALTH - NSBHFUPINTERVALCCFT_PSY_A_CORE
met with patient and his mother during visiting hours;  sleep appetite good. appears calmer;  focused on leaving but not labile;  appetite good;  discussed Seroquel indications and SEs. and need for eye exam in 6 months and likelihood of titration higher than current 50mg at night.  No complaints.  Focused on leaving tomorrow and resolving any transportation issues.  MSW joined  discussion.

## 2018-11-28 NOTE — PROGRESS NOTE BEHAVIORAL HEALTH - NSBHFUPINTERVALHXFT_PSY_A_CORE
.  MSE: ; very discharged focused; rather anxious;  fair eye contact; clean.  oriented ;.  denies AVH or s/h i/i/p; speech normal volume, spontaneous, clear; No AVH;   not preoccupied or blocked.  .  no s/h i/i/p  .;  no tremor or rigidity.   ;anxious; tc/tp no peculiarities of language use; i&j: fair to poor.   not   reflective. much clamer and less irritable.

## 2018-11-29 VITALS
SYSTOLIC BLOOD PRESSURE: 114 MMHG | RESPIRATION RATE: 18 BRPM | HEART RATE: 82 BPM | DIASTOLIC BLOOD PRESSURE: 72 MMHG | TEMPERATURE: 99 F

## 2018-11-29 PROCEDURE — 82607 VITAMIN B-12: CPT

## 2018-11-29 PROCEDURE — 83615 LACTATE (LD) (LDH) ENZYME: CPT

## 2018-11-29 PROCEDURE — 80307 DRUG TEST PRSMV CHEM ANLYZR: CPT

## 2018-11-29 PROCEDURE — 86706 HEP B SURFACE ANTIBODY: CPT

## 2018-11-29 PROCEDURE — 86704 HEP B CORE ANTIBODY TOTAL: CPT

## 2018-11-29 PROCEDURE — 85730 THROMBOPLASTIN TIME PARTIAL: CPT

## 2018-11-29 PROCEDURE — 80061 LIPID PANEL: CPT

## 2018-11-29 PROCEDURE — 86708 HEPATITIS A ANTIBODY: CPT

## 2018-11-29 PROCEDURE — 84443 ASSAY THYROID STIM HORMONE: CPT

## 2018-11-29 PROCEDURE — 83036 HEMOGLOBIN GLYCOSYLATED A1C: CPT

## 2018-11-29 PROCEDURE — 87340 HEPATITIS B SURFACE AG IA: CPT

## 2018-11-29 PROCEDURE — 82962 GLUCOSE BLOOD TEST: CPT

## 2018-11-29 PROCEDURE — 86705 HEP B CORE ANTIBODY IGM: CPT

## 2018-11-29 PROCEDURE — 83540 ASSAY OF IRON: CPT

## 2018-11-29 PROCEDURE — 36415 COLL VENOUS BLD VENIPUNCTURE: CPT

## 2018-11-29 PROCEDURE — 85610 PROTHROMBIN TIME: CPT

## 2018-11-29 PROCEDURE — 82728 ASSAY OF FERRITIN: CPT

## 2018-11-29 PROCEDURE — 99285 EMERGENCY DEPT VISIT HI MDM: CPT

## 2018-11-29 PROCEDURE — 86803 HEPATITIS C AB TEST: CPT

## 2018-11-29 PROCEDURE — 85027 COMPLETE CBC AUTOMATED: CPT

## 2018-11-29 PROCEDURE — 80076 HEPATIC FUNCTION PANEL: CPT

## 2018-11-29 PROCEDURE — 84436 ASSAY OF TOTAL THYROXINE: CPT

## 2018-11-29 PROCEDURE — 86709 HEPATITIS A IGM ANTIBODY: CPT

## 2018-11-29 PROCEDURE — 80053 COMPREHEN METABOLIC PANEL: CPT

## 2018-11-29 PROCEDURE — 94640 AIRWAY INHALATION TREATMENT: CPT

## 2018-11-29 PROCEDURE — 83010 ASSAY OF HAPTOGLOBIN QUANT: CPT

## 2018-11-29 PROCEDURE — 84466 ASSAY OF TRANSFERRIN: CPT

## 2018-11-29 PROCEDURE — 99238 HOSP IP/OBS DSCHRG MGMT 30/<: CPT

## 2018-11-29 PROCEDURE — 70450 CT HEAD/BRAIN W/O DYE: CPT

## 2018-11-29 PROCEDURE — 83550 IRON BINDING TEST: CPT

## 2018-11-29 PROCEDURE — 81001 URINALYSIS AUTO W/SCOPE: CPT

## 2018-11-29 PROCEDURE — 76705 ECHO EXAM OF ABDOMEN: CPT

## 2018-11-29 RX ORDER — ALBUTEROL 90 UG/1
2 AEROSOL, METERED ORAL
Qty: 1 | Refills: 0 | OUTPATIENT
Start: 2018-11-29

## 2018-11-29 RX ORDER — QUETIAPINE FUMARATE 200 MG/1
1 TABLET, FILM COATED ORAL
Qty: 15 | Refills: 0 | OUTPATIENT
Start: 2018-11-29 | End: 2018-12-13

## 2018-11-29 RX ADMIN — Medication 325 MILLIGRAM(S): at 09:43

## 2018-11-29 RX ADMIN — HALOPERIDOL DECANOATE 5 MILLIGRAM(S): 100 INJECTION INTRAMUSCULAR at 09:43

## 2018-11-29 RX ADMIN — ALBUTEROL 2 PUFF(S): 90 AEROSOL, METERED ORAL at 09:43

## 2018-11-29 RX ADMIN — POLYETHYLENE GLYCOL 3350 17 GRAM(S): 17 POWDER, FOR SOLUTION ORAL at 09:43

## 2018-11-29 NOTE — PROGRESS NOTE BEHAVIORAL HEALTH - NSBHATTESTSEENBY_PSY_A_CORE
attending Psychiatrist without NP/Trainee
Trainee with telephonic supervision from Attending Psychiatrist
attending Psychiatrist without NP/Trainee
attending Psychiatrist without NP/Trainee

## 2018-11-29 NOTE — PROGRESS NOTE BEHAVIORAL HEALTH - THOUGHT PROCESS
Illogical/Linear
Linear/Illogical
Linear/Illogical
Perseverative/Illogical/Impaired reasoning/Tangential/Disorganized/Thought blocking
Illogical/Linear

## 2018-11-29 NOTE — PROGRESS NOTE BEHAVIORAL HEALTH - PRIMARY DX
Psychosis, unspecified psychosis type

## 2018-11-29 NOTE — PROGRESS NOTE BEHAVIORAL HEALTH - AXIS III
asthma, mild intermittent, myoneurofibroma on left sided face, elevated LFTs

## 2018-11-29 NOTE — PROGRESS NOTE BEHAVIORAL HEALTH - NSBHCHARTREVIEWVS_PSY_A_CORE FT
Vital Signs Last 24 Hrs  T(C): 37.2 (27 Nov 2018 16:09), Max: 37.2 (27 Nov 2018 16:09)  T(F): 98.9 (27 Nov 2018 16:09), Max: 98.9 (27 Nov 2018 16:09)  HR: 75 (27 Nov 2018 16:09) (75 - 82)  BP: 121/75 (27 Nov 2018 16:09) (121/75 - 124/76)  BP(mean): --  RR: 18 (27 Nov 2018 16:09) (18 - 18)  SpO2: --
Vital Signs Last 24 Hrs  T(C): 36.8 (28 Nov 2018 08:58), Max: 37.2 (27 Nov 2018 16:09)  T(F): 98.3 (28 Nov 2018 08:58), Max: 98.9 (27 Nov 2018 16:09)  HR: 80 (28 Nov 2018 08:58) (75 - 80)  BP: 113/71 (28 Nov 2018 08:58) (113/71 - 121/75)  BP(mean): --  RR: 18 (27 Nov 2018 16:09) (18 - 18)  SpO2: --
Vital Signs Last 24 Hrs  T(C): 37.2 (27 Nov 2018 16:09), Max: 37.2 (27 Nov 2018 16:09)  T(F): 98.9 (27 Nov 2018 16:09), Max: 98.9 (27 Nov 2018 16:09)  HR: 75 (27 Nov 2018 16:09) (75 - 82)  BP: 121/75 (27 Nov 2018 16:09) (121/75 - 124/76)  BP(mean): --  RR: 18 (27 Nov 2018 16:09) (18 - 18)  SpO2: --
Vital Signs Last 24 Hrs  T(C): 37.6 (24 Nov 2018 16:31), Max: 37.6 (24 Nov 2018 16:31)  T(F): 99.6 (24 Nov 2018 16:31), Max: 99.6 (24 Nov 2018 16:31)  HR: 103 (24 Nov 2018 16:31) (94 - 103)  BP: 126/80 (24 Nov 2018 16:31) (126/80 - 128/83)  BP(mean): --  RR: 18 (24 Nov 2018 16:31) (18 - 20)  SpO2: --

## 2018-11-29 NOTE — PROGRESS NOTE BEHAVIORAL HEALTH - NSBHFUPINTERVALHXFT_PSY_A_CORE
.  MSE: ; sitting up awake in bed a little guarded ;  a little less  anxious;  fair eye contact; clean.  oriented ;.  denies AVH or s/h i/i/p; speech normal volume, spontaneous, clear; No AVH;   not preoccupied or blocked.  .  no s/h i/i/p  .;  no tremor or rigidity.   ;anxious; tc/tp no peculiarities of language use; i&j: fair to poor.   not   reflective .

## 2018-11-29 NOTE — PROGRESS NOTE BEHAVIORAL HEALTH - NSBHCHARTREVIEWLAB_PSY_A_CORE FT
11-24    140  |  100  |  12  ----------------------------<  115<H>  4.4   |  26  |  0.85    Ca    9.1      24 Nov 2018 16:27    TPro  7.4  /  Alb  4.6  /  TBili  0.6  /  DBili  x   /  AST  379<H>  /  ALT  112<H>  /  AlkPhos  47  11-24
elevated live enzymes

## 2018-11-29 NOTE — PROGRESS NOTE BEHAVIORAL HEALTH - NSBHFUPINTERVALCCFT_PSY_A_CORE
no sleep appetite or pain issues;  appeared to have liked the meeting with mother yesterday.  Looks forward to leaving.

## 2018-11-29 NOTE — PROGRESS NOTE BEHAVIORAL HEALTH - NS ED BHA MSE GENERAL APPEARANCE
Well developed/Other
Other/Well developed
Well developed/Other

## 2018-11-29 NOTE — PROGRESS NOTE BEHAVIORAL HEALTH - RISK ASSESSMENT
Risk factors: Brought in by EMS, poor insight, underline medical condition and abnormal labs.  Protective factors: Domiciled with family, consent to collateral, good relationship with mother, family supports,  no past psychiatric history, participates in groups
Risk factors: Brought in by EMS, poor insight, underline medical condition and abnormal labs.  Protective factors: Domiciled with family, consent to collateral, good relationship with mother, family supports,  no past psychiatric history, participates in groups
Risk elements: etoh; schizophrenia; undomiciled; bipolar; voluntary; violence; employment unemployed; oriented to situation yes; childhood; alcohol yes; accompanied by self; family;     Static: substance abuse/dependance; chronic psychiatric disorder; lack of support or housing; mood episodes; past violence; under financial stress; past emotional problems; isolated;     Modifiable: substance abuse counselling and treatment and need for referral; treatment of psychotic sxs; improve coping with situation; treat underlying mood issues; reduce aggressive potential with treatment; help  focus on personal goals; clarify assess and treat underlying mood issues; improve interpersonal engagement via groups and therapy; assist engaging possible support network;     Protective: seeks help; cognitively able to engage in treatment; significant degree of family engagement;   Modifiable factors addressed in treatment plan; see summary and interval data for updates
Risk factors: Brought in by EMS, poor insight, underline medical condition and abnormal labs.  Protective factors: Domiciled with family, consent to collateral, good relationship with mother, family supports,  no past psychiatric history, participates in groups

## 2018-11-29 NOTE — PROGRESS NOTE BEHAVIORAL HEALTH - NSBHCHARTREVIEWIMAGING_PSY_A_CORE FT
HCT: no evidence of acute change.  Some subcutaneous soft tissue abnormality.

## 2018-11-29 NOTE — PROGRESS NOTE BEHAVIORAL HEALTH - SUMMARY
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  Patient initially presented with vague complains such as having pain in chest after a fall, then started voicing delusions and psychosis to ED provider, leading to psych consult.  Patient is seen and evaluated by bedside at ED, he is cooperative but oddly related, aaox 3, voiced AH and delusions of having man and/or babies in the chest, stating reason for ED visit was due to "smelling like [place]", oddly related, poor insight on exam, will benefit from psychiatric admission once medically cleared.    ;;11/23: prominent pressured speech and in ED demonstrated paranoid ideas; urine tox negative ; Risperdal titration for mood/psychosis.    ;;11/26: US of Abdomen pending because of abnormal LFTs with ETOH abuse  suspected as an issue.  Irritable and food focused today; fair ADLs.    ;;11/27: US Abdomen wnl; patient accepting Seroquel 50mg at night for mood/anxiety.  Recieved prn Haldol last night when agitated.  No SI or HI or AVH.  Very discharged focused.  Good ADLs.    ;;11/28: met with patient and mother in afternoon. Mother sees patient as improved.  Discussed Seroquel and need for likely dose increase after d/c but patient has been doing well on Seroquel 50mg at night; need for eye exam discussed.  anticipating d/c in am.  ;;11/29: slept well last night; a little calmer; somewhat guarded; d/c today.

## 2018-12-01 DIAGNOSIS — Z59.0 HOMELESSNESS: ICD-10-CM

## 2018-12-01 DIAGNOSIS — R94.5 ABNORMAL RESULTS OF LIVER FUNCTION STUDIES: ICD-10-CM

## 2018-12-01 DIAGNOSIS — D50.9 IRON DEFICIENCY ANEMIA, UNSPECIFIED: ICD-10-CM

## 2018-12-01 DIAGNOSIS — F29 UNSPECIFIED PSYCHOSIS NOT DUE TO A SUBSTANCE OR KNOWN PHYSIOLOGICAL CONDITION: ICD-10-CM

## 2018-12-01 SDOH — ECONOMIC STABILITY - HOUSING INSECURITY: HOMELESSNESS: Z59.0

## 2020-12-16 NOTE — PROGRESS NOTE BEHAVIORAL HEALTH - GROOMING
Fair Home Suture Removal Text: Patient was provided a home suture removal kit and will remove their sutures at home.  If they have any questions or difficulties they will call the office.

## 2022-06-07 NOTE — PROGRESS NOTE BEHAVIORAL HEALTH - PERCEPTIONS
Chronic condition.  Patient is currently taking amlodipine 10 mg daily and hydrochlorothiazide 25 mg daily.  He reports he tolerated medications well without any adverse effects.  He denies any headaches, dizziness, vision changes, or chest pain.  Blood pressure today in clinic is 148 /90with repeat blood pressure of 130/80.  
No abnormalities
Auditory hallucinations
No abnormalities

## 2022-07-08 ENCOUNTER — NEW PATIENT (OUTPATIENT)
Dept: URBAN - METROPOLITAN AREA CLINIC 14 | Facility: CLINIC | Age: 37
End: 2022-07-08

## 2022-07-08 DIAGNOSIS — Q85.00: ICD-10-CM

## 2022-07-08 PROCEDURE — 99202 OFFICE O/P NEW SF 15 MIN: CPT

## 2022-07-08 PROCEDURE — 92285 EXTERNAL OCULAR PHOTOGRAPHY: CPT

## 2022-07-08 ASSESSMENT — VISUAL ACUITY
OS_SC: 20/200
OD_SC: 20/25

## 2024-05-03 NOTE — PROGRESS NOTE BEHAVIORAL HEALTH - [facility]_PSY_A_CORE
05/03/24 1436   OTHER   Discipline physical therapist   Rehab Time/Intention   Session Not Performed other (see comments)  (d/c orders signed)       
yes...
